# Patient Record
Sex: FEMALE | Race: AMERICAN INDIAN OR ALASKA NATIVE | URBAN - METROPOLITAN AREA
[De-identification: names, ages, dates, MRNs, and addresses within clinical notes are randomized per-mention and may not be internally consistent; named-entity substitution may affect disease eponyms.]

---

## 2017-05-15 ENCOUNTER — OFFICE VISIT (OUTPATIENT)
Dept: URGENT CARE | Facility: URGENT CARE | Age: 22
End: 2017-05-15
Payer: COMMERCIAL

## 2017-05-15 VITALS
BODY MASS INDEX: 22.3 KG/M2 | TEMPERATURE: 97.7 F | HEART RATE: 55 BPM | SYSTOLIC BLOOD PRESSURE: 110 MMHG | OXYGEN SATURATION: 97 % | WEIGHT: 134 LBS | DIASTOLIC BLOOD PRESSURE: 70 MMHG

## 2017-05-15 DIAGNOSIS — J02.0 STREPTOCOCCAL SORE THROAT: ICD-10-CM

## 2017-05-15 DIAGNOSIS — R07.0 THROAT PAIN: Primary | ICD-10-CM

## 2017-05-15 LAB
DEPRECATED S PYO AG THROAT QL EIA: ABNORMAL
MICRO REPORT STATUS: ABNORMAL
SPECIMEN SOURCE: ABNORMAL

## 2017-05-15 PROCEDURE — 99213 OFFICE O/P EST LOW 20 MIN: CPT | Performed by: PHYSICIAN ASSISTANT

## 2017-05-15 PROCEDURE — 87880 STREP A ASSAY W/OPTIC: CPT | Performed by: PHYSICIAN ASSISTANT

## 2017-05-15 RX ORDER — AMOXICILLIN 875 MG
875 TABLET ORAL 2 TIMES DAILY
Qty: 20 TABLET | Refills: 0 | Status: SHIPPED | OUTPATIENT
Start: 2017-05-15 | End: 2017-05-25

## 2017-05-15 NOTE — PROGRESS NOTES
SUBJECTIVE:  Prerna Casarez is a 22 year old female with a chief complaint of sore throat.  Onset of symptoms was 2 day(s) ago.    Course of illness: still present.  Severity mild and moderate  Current and Associated symptoms: sore throat  Treatment measures tried include OTC meds.  Predisposing factors include None.    No past medical history on file.  Allergies   Allergen Reactions     No Known Drug Allergy      Social History   Substance Use Topics     Smoking status: Never Smoker     Smokeless tobacco: Never Used     Alcohol use 0.0 - 0.5 oz/week     0 - 1 drink(s) per week       ROS:  CONSTITUTIONAL:NEGATIVE for fever, chills, change in weight  INTEGUMENTARY/SKIN: NEGATIVE for worrisome rashes, moles or lesions  ENT/MOUTH: POSITIVE for throat pain  RESP:NEGATIVE for significant cough or SOB  CV: NEGATIVE for chest pain, palpitations or peripheral edema  MUSCULOSKELETAL: NEGATIVE for significant arthralgias or myalgia  NEURO: NEGATIVE for weakness, dizziness or paresthesias    OBJECTIVE:   /70 (BP Location: Left arm, Patient Position: Chair, Cuff Size: Adult Regular)  Pulse 55  Temp 97.7  F (36.5  C) (Oral)  Wt 134 lb (60.8 kg)  SpO2 97%  BMI 22.3 kg/m2  GENERAL APPEARANCE: healthy, alert and no distress  EYES: EOMI,  PERRL, conjunctiva clear  HENT: TM's normal bilaterally, tonsillar hypertrophy, tonsillar erythema and tonsillar exudate  NECK: cervical adenopathy   RESP: lungs clear to auscultation - no rales, rhonchi or wheezes  CV: regular rates and rhythm, normal S1 S2, no murmur noted  SKIN: no suspicious lesions or rashes    Rapid Strep test is positive    ASSESSMENT:      Throat pain  Streptococcal sore throat    PLAN:   See orders in epic.   Symptomatic treat with gargles, lozenges, and OTC analgesic as needed. Follow-up with primary clinic if not improving.  Orders Placed This Encounter     amoxicillin (AMOXIL) 875 MG tablet       Advisement given that patient will be contagious for the next  24-48 hours after antibiotics initiated

## 2017-05-15 NOTE — MR AVS SNAPSHOT
"              After Visit Summary   5/15/2017    Prerna Casarez    MRN: [id number]           Patient Information     Date Of Birth          1995        Visit Information        Provider Department      5/15/2017 11:40 AM Onesimo Martinez PA-C [facility]        Today's Diagnoses     Throat pain    -  1    Streptococcal sore throat           Follow-ups after your visit        Who to contact     If you have questions or need follow up information about today's clinic visit or your schedule please contact [facility] directly at [phone].  Normal or non-critical lab and imaging results will be communicated to you by Lionexpohart, letter or phone within 4 business days after the clinic has received the results. If you do not hear from us within 7 days, please contact the clinic through Lionexpohart or phone. If you have a critical or abnormal lab result, we will notify you by phone as soon as possible.  Submit refill requests through Farmstr or call your pharmacy and they will forward the refill request to us. Please allow 3 business days for your refill to be completed.          Additional Information About Your Visit        MyChart Information     Farmstr lets you send messages to your doctor, view your test results, renew your prescriptions, schedule appointments and more. To sign up, go to [email or web address] . Click on \"Log in\" on the left side of the screen, which will take you to the Welcome page. Then click on \"Sign up Now\" on the right side of the page.     You will be asked to enter the access code listed below, as well as some personal information. Please follow the directions to create your username and password.     Your access code is: [email or web address]  Expires: 2017  1:20 PM     Your access code will  in 90 days. If you need help or a new code, please call your [place] clinic or [phone].        Care EveryWhere ID     This is your " Care EveryWhere ID. This could be used by other organizations to access your [facility] medical records  [id number]        Your Vitals Were     Pulse Temperature Pulse Oximetry BMI (Body Mass Index)          55 97.7  F (36.5  C) (Oral) 97% 22.3 kg/m2         Blood Pressure from Last 3 Encounters:   05/15/17 110/70   09/16/16 105/69   12/27/13 108/70    Weight from Last 3 Encounters:   05/15/17 134 lb (60.8 kg)   09/16/16 134 lb (60.8 kg)   12/27/13 134 lb 14.4 oz (61.2 kg)              We Performed the Following     Rapid strep screen          Today's Medication Changes          These changes are accurate as of: 5/15/17  1:20 PM.  If you have any questions, ask your nurse or doctor.               Start taking these medicines.        Dose/Directions    amoxicillin 875 MG tablet   Commonly known as:  AMOXIL   Used for:  Streptococcal sore throat   Started by:  Onesimo Martinez PA-C        Dose:  875 mg   Take 1 tablet (875 mg) by mouth 2 times daily for 10 days   Quantity:  20 tablet   Refills:  0            Where to get your medicines      These medications were sent to [place] [address] [place] [facility] [address]  [address] [address]     Phone:  [phone]     amoxicillin 875 MG tablet                Primary Care Provider Office Phone # Fax #    Alesha Sanchez [phone]       [facility] [address] [facility] [address]  [place] [address]        Thank you!     Thank you for choosing [facility]  for your care. Our goal is always to provide you with excellent care. Hearing back from our patients is one way we can continue to improve our services. Please take a few minutes to complete the written survey that you may receive in the mail after your visit with us. Thank you!             Your Updated Medication List - Protect others around you: Learn how to safely use, store and throw away your medicines at www.disposemymeds.org.           This list is accurate as of: 5/15/17  1:20 PM.  Always use your most recent med list.                   Brand Name Dispense Instructions for use    amoxicillin 875 MG tablet    AMOXIL    20 tablet    Take 1 tablet (875 mg) by mouth 2 times daily for 10 days       omeprazole 40 MG capsule    priLOSEC    30 capsule    Take 1 capsule (40 mg) by mouth daily Take 30-60 minutes before a meal.       RECLIPSEN 0.15-30 MG-MCG per tablet   Generic drug:  desogestrel-ethinyl estradiol      Take 1 tablet by mouth daily

## 2017-05-15 NOTE — LETTER
[facility]  [address] [address]  [phone]      May 15, 2017    RE:  Prerna Casarez                                                                                                                                                       [facility] [address]   [place] [address]            To whom it may concern:    Prerna Casarez was seen in the urgent care today for strep throat.  She will miss work Mon and Tues due to the contagiousness of this infection.        Sincerely,        Onesimo Martinez [facility]

## 2017-06-20 ENCOUNTER — TELEPHONE (OUTPATIENT)
Dept: FAMILY MEDICINE | Facility: CLINIC | Age: 22
End: 2017-06-20

## 2017-06-20 NOTE — TELEPHONE ENCOUNTER
6/20/2017    Call Regarding Preventive Health Screening Cervical/PAP    Attempt 1    Message on voicemail     Comments:           Outreach   ANA

## 2017-06-26 NOTE — TELEPHONE ENCOUNTER
6/26/2017    Call Regarding Preventive Health Screening Cervical/PAP    Attempt 2    Message on voicemail     Comments:         Outreach   ANA

## 2017-07-01 NOTE — TELEPHONE ENCOUNTER
7/1/2017    Call Regarding Preventive Health Screening Cervical/PAP    Attempt 3    Message on voicemail     Comments:           Outreach   ANA

## 2017-07-12 NOTE — TELEPHONE ENCOUNTER
7/12/2017      Patient will check work schedule and call back to schedule.        Outreach ,  Antonio Payne

## 2017-07-20 ENCOUNTER — OFFICE VISIT (OUTPATIENT)
Dept: FAMILY MEDICINE | Facility: CLINIC | Age: 22
End: 2017-07-20
Payer: COMMERCIAL

## 2017-07-20 ENCOUNTER — RESULT FOLLOW UP (OUTPATIENT)
Dept: FAMILY MEDICINE | Facility: CLINIC | Age: 22
End: 2017-07-20

## 2017-07-20 VITALS
SYSTOLIC BLOOD PRESSURE: 113 MMHG | HEART RATE: 48 BPM | TEMPERATURE: 98.2 F | DIASTOLIC BLOOD PRESSURE: 62 MMHG | HEIGHT: 65 IN | WEIGHT: 137.6 LBS | OXYGEN SATURATION: 98 % | BODY MASS INDEX: 22.92 KG/M2

## 2017-07-20 DIAGNOSIS — Z01.419 ENCOUNTER FOR GYNECOLOGICAL EXAMINATION WITHOUT ABNORMAL FINDING: Primary | ICD-10-CM

## 2017-07-20 DIAGNOSIS — R87.610 PAP SMEAR CANNOT EXCLUDE HIGH GRADE SQUAMOUS INTRAEPITHELIAL LESION (ASC-H): ICD-10-CM

## 2017-07-20 DIAGNOSIS — Z30.41 ENCOUNTER FOR SURVEILLANCE OF CONTRACEPTIVE PILLS: ICD-10-CM

## 2017-07-20 PROCEDURE — 87491 CHLMYD TRACH DNA AMP PROBE: CPT | Performed by: NURSE PRACTITIONER

## 2017-07-20 PROCEDURE — G0145 SCR C/V CYTO,THINLAYER,RESCR: HCPCS | Performed by: NURSE PRACTITIONER

## 2017-07-20 PROCEDURE — G0124 SCREEN C/V THIN LAYER BY MD: HCPCS | Performed by: NURSE PRACTITIONER

## 2017-07-20 PROCEDURE — 87591 N.GONORRHOEAE DNA AMP PROB: CPT | Performed by: NURSE PRACTITIONER

## 2017-07-20 PROCEDURE — 99395 PREV VISIT EST AGE 18-39: CPT | Performed by: NURSE PRACTITIONER

## 2017-07-20 RX ORDER — DESOGESTREL AND ETHINYL ESTRADIOL 0.15-0.03
1 KIT ORAL DAILY
Qty: 28 TABLET | Refills: 11 | Status: SHIPPED | OUTPATIENT
Start: 2017-07-20

## 2017-07-20 NOTE — LETTER
March 6, 2018      Prerna Casarez  [address]   [place] [address]    Dear ,      At [facility], your health and wellness is our primary concern. 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 Your provider had recommended that you have a Pap smear and Colposcopy completed by 4/11/18. Our records do not show that this has been scheduled.    It is important to complete the follow up that your provider has suggested for you to ensure that there are no worsening changes which may, over time, develop into cancer.      Please contact our office at  [phone] to schedule an appointment for a Pap Smear and Colposcopy at your earliest convenience. If you have questions or concerns, please call the clinic and we will be happy to assist you.    If you have completed the tests outside of [facility], please have the results forwarded to our office. We will update the chart for your primary Physician to review before your next annual physical.     Thank you for choosing [facility]!    Sincerely,      Marialuisa Fatima, KARYN CNP/rlm

## 2017-07-20 NOTE — MR AVS SNAPSHOT
After Visit Summary   7/20/2017    Prerna Casarez    MRN: [id number]           Patient Information     Date Of Birth          1995        Visit Information        Provider Department      7/20/2017 11:20 AM Marialuisa Fatima APRN [facility]        Today's Diagnoses     Encounter for gynecological examination without abnormal finding    -  1    Encounter for surveillance of contraceptive pills          Care Instructions      Preventive Health Recommendations  Female Ages 18 to 25     Yearly exam:     See your health care provider every year in order to  o Review health changes.   o Discuss preventive care.    o Review your medicines if your doctor has prescribed any.      You should be tested each year for STDs (sexually transmitted diseases).       After age 20, talk to your provider about how often you should have cholesterol testing.      Starting at age 21, get a Pap test every three years. If you have an abnormal result, your doctor may have you test more often.      If you are at risk for diabetes, you should have a diabetes test (fasting glucose).     Shots:     Get a flu shot each year.     Get a tetanus shot every 10 years.     Consider getting the shot (vaccine) that prevents cervical cancer (Gardasil).    Nutrition:     Eat at least 5 servings of fruits and vegetables each day.    Eat whole-grain bread, whole-wheat pasta and brown rice instead of white grains and rice.    Talk to your provider about Calcium and Vitamin D.     Lifestyle    Exercise at least 150 minutes a week each week (30 minutes a day, 5 days a week). This will help you control your weight and prevent disease.    Limit alcohol to one drink per day.    No smoking.     Wear sunscreen to prevent skin cancer.    See your dentist every six months for an exam and cleaning.          Follow-ups after your visit        Who to contact     If you have questions or need follow up information about  "today's clinic visit or your schedule please contact [facility] directly at [phone].  Normal or non-critical lab and imaging results will be communicated to you by Lagotekhart, letter or phone within 4 business days after the clinic has received the results. If you do not hear from us within 7 days, please contact the clinic through Lagotekhart or phone. If you have a critical or abnormal lab result, we will notify you by phone as soon as possible.  Submit refill requests through [email or web address] or call your pharmacy and they will forward the refill request to us. Please allow 3 business days for your refill to be completed.          Additional Information About Your Visit        LagotekharCorpU Information     [email or web address] lets you send messages to your doctor, view your test results, renew your prescriptions, schedule appointments and more. To sign up, go to [email or web address] . Click on \"Log in\" on the left side of the screen, which will take you to the Welcome page. Then click on \"Sign up Now\" on the right side of the page.     You will be asked to enter the access code listed below, as well as some personal information. Please follow the directions to create your username and password.     Your access code is: [email or web address]  Expires: 2017  1:20 PM     Your access code will  in 90 days. If you need help or a new code, please call your [place] clinic or [phone].        Care EveryWhere ID     This is your Care EveryWhere ID. This could be used by other organizations to access your [place] medical records  [id number]        Your Vitals Were     Pulse Temperature Height Pulse Oximetry BMI (Body Mass Index)       48 98.2  F (36.8  C) (Oral) 5' 4.5\" (1.638 m) 98% 23.25 kg/m2        Blood Pressure from Last 3 Encounters:   17 113/62   05/15/17 110/70   16 105/69    Weight from Last 3 Encounters:   17 137 lb 9.6 oz (62.4 kg)   05/15/17 134 lb (60.8 kg)   16 134 lb (60.8 kg)    "           We Performed the Following     CHLAMYDIA TRACHOMATIS PCR     NEISSERIA GONORRHOEA PCR     Pap imaged thin layer screen only - recommended age 21 - 24 years          Where to get your medicines      These medications were sent to [facility] [address] [place] [facility] - [place], [place] - [address] [facility]  [address] [facility] [address]     Phone:  [phone]     desogestrel-ethinyl estradiol 0.15-30 MG-MCG per tablet          Primary Care Provider Office Phone # Fax #    Alesha Sanchez [phone]       [facility] [address]  [place] [address]        Equal Access to Services     [facility] CenterIVIS : Hadii aad ku hadasho Soomaali, waaxda luqadaha, qaybta kaalmada adeegyada, waxgiorgio poonin haygeorginan mckinley ferrell . So [facility] [phone].    ATENCIÓN: Si habla español, tiene a hooper disposición servicios gratuitos de asistencia lingüística. LlGrand [facility] [phone].    We comply with applicable federal civil rights laws and [place] laws. We do not discriminate on the basis of race, color, national origin, age, disability sex, sexual orientation or gender identity.            Thank you!     Thank you for choosing [facility]  for your care. Our goal is always to provide you with excellent care. Hearing back from our patients is one way we can continue to improve our services. Please take a few minutes to complete the written survey that you may receive in the mail after your visit with us. Thank you!             Your Updated Medication List - Protect others around you: Learn how to safely use, store and throw away your medicines at www.disposemymeds.org.          This list is accurate as of: 7/20/17 12:19 PM.  Always use your most recent med list.                   Brand Name Dispense Instructions for use Diagnosis    desogestrel-ethinyl estradiol 0.15-30 MG-MCG per tablet    RECLIPSEN    28 tablet    Take 1 tablet by mouth daily    Encounter for surveillance of  contraceptive pills       omeprazole 40 MG capsule    priLOSEC    30 capsule    Take 1 capsule (40 mg) by mouth daily Take 30-60 minutes before a meal.    Gastritis

## 2017-07-20 NOTE — PROGRESS NOTES
SUBJECTIVE:   CC: Prerna Casarez is an 22 year old woman who presents for preventive health visit.     Physical   Annual:     Getting at least 3 servings of Calcium per day::  Yes    Bi-annual eye exam::  Yes    Dental care twice a year::  Yes    Sleep apnea or symptoms of sleep apnea::  None    Diet::  Regular (no restrictions)    Frequency of exercise::  4-5 days/week    Duration of exercise::  45-60 minutes    Taking medications regularly::  Yes    Medication side effects::  None    Additional concerns today::  No      Today's PHQ-2 Score:   PHQ-2 ( 1999 Pfizer) 7/20/2017   Q1: Little interest or pleasure in doing things 0   Q2: Feeling down, depressed or hopeless 0   PHQ-2 Score 0   Q1: Little interest or pleasure in doing things Not at all   Q2: Feeling down, depressed or hopeless Not at all   PHQ-2 Score 0       Abuse: Current or Past(Physical, Sexual or Emotional)- No  Do you feel safe in your environment - Yes    Social History   Substance Use Topics     Smoking status: Never Smoker     Smokeless tobacco: Never Used     Alcohol use 0.0 - 0.5 oz/week     0 - 1 drink(s) per week     The patient does not drink >3 drinks per day nor >7 drinks per week.    Reviewed orders with patient.  Reviewed health maintenance and updated orders accordingly - Yes    Mammogram not appropriate for this patient based on age.    Pertinent mammograms are reviewed under the imaging tab.  History of abnormal Pap smear: NO - age 21-29 PAP every 3 years recommended    Reviewed and updated as needed this visit by clinical staff  Tobacco  Allergies  Meds  Problems  Med Hx  Surg Hx  Fam Hx  Soc Hx          Reviewed and updated as needed this visit by Provider  Tobacco  Allergies  Meds  Problems  Med Hx  Surg Hx  Fam Hx  Soc Hx         Sexually active, male  partners,  Would like GC std screening.  Is using ocp for contraception.      Works at [employer] as a [employer].     Does exercise regularly.  Does  eat a well  "balanced diet including plenty of fruits, vegetables, calcium and proteins.      Refill OCP.          ROS:  C: NEGATIVE for fever, chills, change in weight  I: NEGATIVE for worrisome rashes, moles or lesions  E: NEGATIVE for vision changes or irritation  ENT: NEGATIVE for ear, mouth and throat problems  R: NEGATIVE for significant cough or SOB  B: NEGATIVE for masses, tenderness or discharge  CV: NEGATIVE for chest pain, palpitations or peripheral edema  GI: NEGATIVE for nausea, abdominal pain, heartburn, or change in bowel habits  : NEGATIVE for unusual urinary or vaginal symptoms. Periods are regular.  M: NEGATIVE for significant arthralgias or myalgia  N: NEGATIVE for weakness, dizziness or paresthesias  P: NEGATIVE for changes in mood or affect     OBJECTIVE:   /62  Pulse (!) 48  Temp 98.2  F (36.8  C) (Oral)  Ht 5' 4.5\" (1.638 m)  Wt 137 lb 9.6 oz (62.4 kg)  SpO2 98%  BMI 23.25 kg/m2  EXAM:  GENERAL: healthy, alert and no distress  EYES: Eyes grossly normal to inspection, PERRL and conjunctivae and sclerae normal  HENT: ear canals and TM's normal, nose and mouth without ulcers or lesions  NECK: no adenopathy, no asymmetry, masses, or scars and thyroid normal to palpation  RESP: lungs clear to auscultation - no rales, rhonchi or wheezes  BREAST: normal without masses, tenderness or nipple discharge and no palpable axillary masses or adenopathy  CV: regular rate and rhythm, normal S1 S2, no S3 or S4, no murmur, click or rub, no peripheral edema and peripheral pulses strong  ABDOMEN: soft, nontender, no hepatosplenomegaly, no masses and bowel sounds normal   (female): normal female external genitalia, normal urethral meatus, vaginal mucosa pink, moist, well rugated, and normal cervix/adnexa/uterus without masses or discharge  MS: no gross musculoskeletal defects noted, no edema  SKIN: no suspicious lesions or rashes  NEURO: Normal strength and tone, mentation intact and speech normal  PSYCH: " "mentation appears normal, affect normal/bright    ASSESSMENT/PLAN:       ICD-10-CM    1. Encounter for gynecological examination without abnormal finding Z01.419 HUMAN PAPILLOMAVIRUS VACCINE     NEISSERIA GONORRHOEA PCR     CHLAMYDIA TRACHOMATIS PCR     Pap imaged thin layer screen only - recommended age 21 - 24 years   2. Encounter for surveillance of contraceptive pills Z30.41 desogestrel-ethinyl estradiol (RECLIPSEN) 0.15-30 MG-MCG per tablet      well female, not fasting for labs.  Encouraged to continue exercise and healthy diet choices.      The use of the oral contraceptive has been fully discussed with the patient.   The patient has been told of the more serious potential side effects such as MI, stroke, and deep vein thrombosis, all of which are very unlikely.  She has been asked to report any signs of such serious problems immediately.  She should back up the pill with a condom during the first cycle.  She has been given written literature regarding use and side effects of oral contraceptives. The need for additional protection, such as a condom, to prevent exposure to sexually transmitted diseases has also been discussed- the patient has been clearly reminded that OCP's cannot protect her against diseases such as HIV and others. She understands and wishes to take the medication as prescribed.      COUNSELING:  Reviewed preventive health counseling, as reflected in patient instructions    BP Screening:   Last 3 BP Readings:    BP Readings from Last 3 Encounters:   07/20/17 113/62   05/15/17 110/70   09/16/16 105/69       The following was recommended to the patient:     reports that she has never smoked. She has never used smokeless tobacco.  Estimated body mass index is 23.25 kg/(m^2) as calculated from the following:    Height as of this encounter: 5' 4.5\" (1.638 m).    Weight as of this encounter: 137 lb 9.6 oz (62.4 kg).     Counseling Resources:  ATP IV Guidelines  Pooled Cohorts Equation " Calculator  Breast Cancer Risk Calculator  FRAX Risk Assessment  ICSI Preventive Guidelines  Dietary Guidelines for Americans, 2010  Saber Hacer's MyPlate  ASA Prophylaxis  Lung CA Screening    KARYN Munoz CNP  [facility]  Answers for HPI/ROS submitted by the patient on 7/20/2017   PHQ-2 Score: 0

## 2017-07-20 NOTE — LETTER
Prerna Casarez  [address] [place] [address]        August 3, 2017          Dear ,    We are writing to inform you of your test results.    Your chlamydia and gonorrhea screening are negative, which is normal.     Please let me know if you have any questions.     Resulted Orders   NEISSERIA GONORRHOEA PCR   Result Value Ref Range    Specimen Descrip Vagina     N Gonorrhea PCR  NEG     Negative   Negative for N. gonorrhoeae rRNA by transcription mediated amplification.   A negative result by transcription mediated amplification does not preclude the   presence of N. gonorrhoeae infection because results are dependent on proper   and adequate collection, absence of inhibitors, and sufficient rRNA to be   detected.     CHLAMYDIA TRACHOMATIS PCR   Result Value Ref Range    Specimen Description Vagina     Chlamydia Trachomatis PCR  NEG     Negative   Negative for C. trachomatis rRNA by transcription mediated amplification.   A negative result by transcription mediated amplification does not preclude the   presence of C. trachomatis infection because results are dependent on proper   and adequate collection, absence of inhibitors, and sufficient rRNA to be   detected.         If you have any questions or concerns, please call the clinic at the number listed above.     Sincerely,  Marialuisa Fatima, KARYN CNP/nr

## 2017-07-20 NOTE — NURSING NOTE
"Chief Complaint   Patient presents with     Physical       Initial /62  Pulse (!) 48  Temp 98.2  F (36.8  C) (Oral)  Ht 5' 4.5\" (1.638 m)  Wt 137 lb 9.6 oz (62.4 kg)  SpO2 98%  BMI 23.25 kg/m2 Estimated body mass index is 23.25 kg/(m^2) as calculated from the following:    Height as of this encounter: 5' 4.5\" (1.638 m).    Weight as of this encounter: 137 lb 9.6 oz (62.4 kg).  Medication Reconciliation: complete   Ghislaine Fisher MA      "

## 2017-07-21 LAB
C TRACH DNA SPEC QL NAA+PROBE: NORMAL
N GONORRHOEA DNA SPEC QL NAA+PROBE: NORMAL
SPECIMEN SOURCE: NORMAL
SPECIMEN SOURCE: NORMAL

## 2017-07-26 LAB
COPATH REPORT: ABNORMAL
PAP: ABNORMAL

## 2017-07-26 NOTE — PROGRESS NOTES
07/20/17: Asc-h pap result. Plan Colposcopy due by 10/20/17.  07/26/17: I called the pt and left a message for the pt to call me back.  08/01/17: I called the pt and left a message for the pt to call me back. Pt returned the call and was informed of the pap result and given the recommendation for a [place] due by 10/20/17.   10/11/17: [place] BX and ECC HSIL DERIK 2-3 with gland involvement. Plan per provider (colposcopy and cytology in 6 month intervals for 12 months) or LEEP. Due to young age, I recommend repeat cytology and colposcopy in 6 months.  10/13/17: Provider attempted to contact the pt by phone, no answer.   10/16/17: Msg left to call back. The pt returned the call and said that she did talk with Dr. Monterroso on Friday and was given these results and recommendations. She will plan on repeating a colp and pap in 6 months.   3/6/18 [place] and Pap reminder letter sent (rl)  03/28/18:Msg left to call back.  4/13/18 Patient called back. We discussed recommendation for repeat colp and cytology. Extensive conversation regarding nature of abnormality, HPV (although she was not tested D/T age), reason for frequent monitoring to ensure no further progression or treatment needed. Questions answered to patient's satisfaction. Patient is planning to discuss this with her fiance and her mother and will plan to call back to schedule or discuss. (LN)  12/17/18 Patient is lost to pap tracking follow-up. MILA routed to provider. (es)

## 2017-08-01 ENCOUNTER — TELEPHONE (OUTPATIENT)
Dept: FAMILY MEDICINE | Facility: CLINIC | Age: 22
End: 2017-08-01

## 2017-08-01 NOTE — TELEPHONE ENCOUNTER
Negative chlamydia 7/20/2017 and partner came back with a positive chlamydia 2 days after she was tested. Should she be treated seeing he was positive or should she retake the test? She has not had intercourse with his since she was tested. Please call she has lots of questions.

## 2017-08-02 NOTE — TELEPHONE ENCOUNTER
Pt got some new information. He was notified that he does not have chlamydia- he has lisa-nongonococcal urethritis  That dr advised that she take the treatment as well which is 1G of zithromax.  Advised to do what that md said, and to start taking probiotics 2 days later, daily for 15 days to one month afterwards. Rationale discussed.  Ella Whitaker RN

## 2017-10-11 ENCOUNTER — OFFICE VISIT (OUTPATIENT)
Dept: OBGYN | Facility: CLINIC | Age: 22
End: 2017-10-11
Payer: COMMERCIAL

## 2017-10-11 VITALS
HEART RATE: 57 BPM | BODY MASS INDEX: 23.49 KG/M2 | WEIGHT: 139 LBS | SYSTOLIC BLOOD PRESSURE: 110 MMHG | DIASTOLIC BLOOD PRESSURE: 70 MMHG | TEMPERATURE: 97.8 F

## 2017-10-11 DIAGNOSIS — R87.610 PAP SMEAR CANNOT EXCLUDE HIGH GRADE SQUAMOUS INTRAEPITHELIAL LESION (ASC-H): Primary | ICD-10-CM

## 2017-10-11 LAB — BETA HCG QUAL IFA URINE: NEGATIVE

## 2017-10-11 PROCEDURE — 57454 BX/CURETT OF CERVIX W/SCOPE: CPT | Performed by: OBSTETRICS & GYNECOLOGY

## 2017-10-11 PROCEDURE — 84703 CHORIONIC GONADOTROPIN ASSAY: CPT | Performed by: OBSTETRICS & GYNECOLOGY

## 2017-10-11 PROCEDURE — 88305 TISSUE EXAM BY PATHOLOGIST: CPT | Performed by: OBSTETRICS & GYNECOLOGY

## 2017-10-11 NOTE — MR AVS SNAPSHOT
"              After Visit Summary   10/11/2017    Prerna Casarez    MRN: [id number]           Patient Information     Date Of Birth          1995        Visit Information        Provider Department      10/11/2017 2:00 PM Marialuisa Monterroso MD; RD [facility]        Today's Diagnoses     Pap smear cannot exclude high grade squamous intraepithelial lesion (ASC-H)    -  1       Follow-ups after your visit        Who to contact     If you have questions or need follow up information about today's clinic visit or your schedule please contact [facility] directly at [phone].  Normal or non-critical lab and imaging results will be communicated to you by MyChart, letter or phone within 4 business days after the clinic has received the results. If you do not hear from us within 7 days, please contact the clinic through [facility] or phone. If you have a critical or abnormal lab result, we will notify you by phone as soon as possible.  Submit refill requests through [facility] or call your pharmacy and they will forward the refill request to us. Please allow 3 business days for your refill to be completed.          Additional Information About Your Visit        MyChart Information     [facility] lets you send messages to your doctor, view your test results, renew your prescriptions, schedule appointments and more. To sign up, go to [email or web address] . Click on \"Log in\" on the left side of the screen, which will take you to the Welcome page. Then click on \"Sign up Now\" on the right side of the page.     You will be asked to enter the access code listed below, as well as some personal information. Please follow the directions to create your username and password.     Your access code is: [email or web address]  Expires: 2018  5:50 PM     Your access code will  in 90 days. If you need help or a new code, please call your [facility] or [phone].        Care EveryWhere " ID     This is your Care EveryWhere ID. This could be used by other organizations to access your [place] medical records  [id number]        Your Vitals Were     Pulse Temperature Last Period BMI (Body Mass Index)          57 97.8  F (36.6  C) (Oral) 09/11/2017 23.49 kg/m2         Blood Pressure from Last 3 Encounters:   10/11/17 110/70   07/20/17 113/62   05/15/17 110/70    Weight from Last 3 Encounters:   10/11/17 139 lb (63 kg)   07/20/17 137 lb 9.6 oz (62.4 kg)   05/15/17 134 lb (60.8 kg)              We Performed the Following     Beta HCG qual IFA urine     COLP CERVIX/UPPER VAGINA W BX CERVIX/ENDOCERV CURETT     Surgical pathology exam        Primary Care Provider Office Phone # Fax #    Alesha Sanchez [phone]       [address] [place] [address]        Equal Access to Services     [facility] : Hadii aad ku hadasho Soomaali, waaxda luqadaha, qaybta kaalmada adeegyada, waxay ayahin haygeorginan mckinley ferrell . So [facility] [phone].    ATENCIÓN: Si shondala almita, tiene a hooper disposición servicios gratuitos de asistencia lingüística. Llame al [phone].    We comply with applicable federal civil rights laws and [place] laws. We do not discriminate on the basis of race, color, national origin, age, disability, sex, sexual orientation, or gender identity.            Thank you!     Thank you for choosing [facility]  for your care. Our goal is always to provide you with excellent care. Hearing back from our patients is one way we can continue to improve our services. Please take a few minutes to complete the written survey that you may receive in the mail after your visit with us. Thank you!             Your Updated Medication List - Protect others around you: Learn how to safely use, store and throw away your medicines at www.disposemymeds.org.          This list is accurate as of: 10/11/17  5:50 PM.  Always use your most recent med list.                    Brand Name Dispense Instructions for use Diagnosis    desogestrel-ethinyl estradiol 0.15-30 MG-MCG per tablet    RECLIPSEN    28 tablet    Take 1 tablet by mouth daily    Encounter for surveillance of contraceptive pills       omeprazole 40 MG capsule    priLOSEC    30 capsule    Take 1 capsule (40 mg) by mouth daily Take 30-60 minutes before a meal.    Gastritis

## 2017-10-11 NOTE — PROGRESS NOTES
GYN CLINIC VISIT  10/11/2017  CC: colposcopy    HPI:  Prerna Casarez is a 22 year old female No obstetric history on file. who presents for initial colposcopy, referred by MISTY LITTLE. Pap smear on 07/20/17 showed: ASC-H. The prior pap showed normal.       No LMP recorded.  UPT today is negative  Patient does not smoke  Type of contraception: oral contraceptive  Age at first sexual intercourse: 17-18  Number of sexual partners (lifetime): 8  Past GYN history: No STD history  Prior cervical/vaginal disease: Normal exam without visible pathology.  Prior cervical treatment: no treatment.    Vitals:    10/11/17 1422   BP: 110/70   Pulse: 57   Temp: 97.8  F (36.6  C)   TempSrc: Oral   Weight: 139 lb (63 kg)       PROCEDURE:  Before the procedure, it was ensured that the patient was educated regarding the nature of her findings to date, the implications, and what was to be done. She has been made aware of the role of HPV, the natural history of infection, ways to minimize her future risk, the effect of HPV on the cervix, and treatment options available should they be indicated. The details of the colposcopic procedure were reviewed. All questions were answered before proceeding, and informed consent was therefore obtained.    Speculum placed in vagina and excellent visualization of cervix acheived, cervix swabbed x 3 with acetic acid solution.      FINDINGS:  Cervix: acetowhitening noted circumferentially, more dense at 12 and 6 o'clock. Representative biopsies taken at 6 and 12 o'clock.   SCJ seen?: yes   ECC done?: Yes  Satisfactory examination?: yes      ASSESSMENT: DERIK 1-2.  PLAN:   specimens labelled and sent to Pathology, will base further treatment on Pathology findings, treatment options discussed with patient, post biopsy instructions given to patient and call to discuss Pathology results    I recommend HPV vaccine. She declines today.    1. Pap smear cannot exclude high grade squamous intraepithelial  lesion (ASC-H)  - Beta HCG qual IFA urine  - Surgical pathology exam  - COLP CERVIX/UPPER VAGINA W BX CERVIX/ENDOCERV CURETT      Marialuisa Monterroso MD

## 2017-10-11 NOTE — NURSING NOTE
"Chief Complaint   Patient presents with     Colposcopy       Initial /70  Pulse 57  Temp 97.8  F (36.6  C) (Oral)  Wt 139 lb (63 kg)  LMP 09/11/2017  BMI 23.49 kg/m2 Estimated body mass index is 23.49 kg/(m^2) as calculated from the following:    Height as of 7/20/17: 5' 4.5\" (1.638 m).    Weight as of this encounter: 139 lb (63 kg).  BP completed using cuff size: regular    No obstetric history on file.    The following HM Due: NONE      The following patient reported/Care Every where data was sent to:  P ABSTRACT QUALITY INITIATIVES [[id number]]  na     n/a             "

## 2017-10-13 ENCOUNTER — TELEPHONE (OUTPATIENT)
Dept: OBGYN | Facility: CLINIC | Age: 22
End: 2017-10-13

## 2017-10-13 LAB — COPATH REPORT: NORMAL

## 2017-10-13 NOTE — TELEPHONE ENCOUNTER
Prerna Casarez is a 22 year old who underwent colposcopy on 10/11/17 for ASC-H. I called patient to follow up to see how she is feeling after the procedure and to discuss pathology.    No answer, left message to call clinic.    Pathology showed:  SPECIMEN(S):   A: Cervical biopsy, 12 o'clock   B: Cervical biopsy, 6 o'clock   C: Endocervical curettings     FINAL DIAGNOSIS:   A. Cervix, 12 o'clock:   - High grade squamous intraepithelial lesion (DERIK II/III, moderate to   severe dysplasia).   - Cervical glandular epithelium with no evidence of glandular neoplasia.     B. Cervix, 6 o'clock:   - High grade squamous intraepithelial lesion (DERIK II/III, moderate to   severe dysplasia) with gland involvement.     C. Cervix, endocervical curettings:   - High grade squamous intraepithelial lesion (DERIK II/III, moderate to   severe dysplasia).   - Cervical glandular epithelium with no evidence of glandular neoplasia.     COMMENT:   The previous Pap smear [id number] was reviewed and the diagnosis of ASC-H   was confirmed.       Per ASCCP guidelines, recommendation is observation with colposcopy and cytology at 6 month intervals for 12 months or excision with LEEP.  Due to patient's age, I recommend colposcopy and cytology in 6 months.     Marialuisa Monterroso MD

## 2017-10-13 NOTE — TELEPHONE ENCOUNTER
Called patient back. Discussed DERIK 2/3. Recommend colposcopy and cytology in 6 and 12 months.   Marialuisa Monterroso MD

## 2018-04-13 PROBLEM — R87.610 PAP SMEAR CANNOT EXCLUDE HIGH GRADE SQUAMOUS INTRAEPITHELIAL LESION (ASC-H): Status: ACTIVE | Noted: 2017-07-20

## 2018-05-14 ENCOUNTER — HEALTH MAINTENANCE LETTER (OUTPATIENT)
Age: 23
End: 2018-05-14

## 2018-06-11 ENCOUNTER — HEALTH MAINTENANCE LETTER (OUTPATIENT)
Age: 23
End: 2018-06-11

## 2018-11-19 ENCOUNTER — HEALTH MAINTENANCE LETTER (OUTPATIENT)
Age: 23
End: 2018-11-19

## 2021-12-20 NOTE — NURSING NOTE
"Chief Complaint   Patient presents with     Pharyngitis       Initial /70 (BP Location: Left arm, Patient Position: Chair, Cuff Size: Adult Regular)  Pulse 55  Temp 97.7  F (36.5  C) (Oral)  Wt 134 lb (60.8 kg)  SpO2 97%  BMI 22.3 kg/m2 Estimated body mass index is 22.3 kg/(m^2) as calculated from the following:    Height as of 9/16/16: 5' 5\" (1.651 m).    Weight as of this encounter: 134 lb (60.8 kg).  Medication Reconciliation: complete  "
no abdominal pain/no cough/no headache/no vomiting